# Patient Record
Sex: MALE | URBAN - METROPOLITAN AREA
[De-identification: names, ages, dates, MRNs, and addresses within clinical notes are randomized per-mention and may not be internally consistent; named-entity substitution may affect disease eponyms.]

---

## 2023-11-07 ENCOUNTER — LAB OUTSIDE AN ENCOUNTER (OUTPATIENT)
Dept: URBAN - METROPOLITAN AREA CLINIC 63 | Facility: CLINIC | Age: 73
End: 2023-11-07

## 2023-11-07 ENCOUNTER — OFFICE VISIT (OUTPATIENT)
Dept: URBAN - METROPOLITAN AREA CLINIC 63 | Facility: CLINIC | Age: 73
End: 2023-11-07
Payer: MEDICARE

## 2023-11-07 VITALS
DIASTOLIC BLOOD PRESSURE: 66 MMHG | HEART RATE: 62 BPM | SYSTOLIC BLOOD PRESSURE: 100 MMHG | BODY MASS INDEX: 24.32 KG/M2 | OXYGEN SATURATION: 99 % | TEMPERATURE: 97.3 F | WEIGHT: 164.2 LBS | HEIGHT: 69 IN

## 2023-11-07 DIAGNOSIS — R19.5 HEME POSITIVE STOOL: ICD-10-CM

## 2023-11-07 DIAGNOSIS — I25.10 CAD, MULTIPLE VESSEL: ICD-10-CM

## 2023-11-07 DIAGNOSIS — I50.9 CHRONIC CONGESTIVE HEART FAILURE, UNSPECIFIED HEART FAILURE TYPE: ICD-10-CM

## 2023-11-07 DIAGNOSIS — I25.5 ISCHEMIC CARDIOMYOPATHY: ICD-10-CM

## 2023-11-07 PROBLEM — 709044004: Status: ACTIVE | Noted: 2023-11-07

## 2023-11-07 PROBLEM — 371803003: Status: ACTIVE | Noted: 2023-11-07

## 2023-11-07 PROBLEM — 88805009: Status: ACTIVE | Noted: 2023-11-07

## 2023-11-07 PROBLEM — 281091000: Status: ACTIVE | Noted: 2023-11-07

## 2023-11-07 PROCEDURE — 99204 OFFICE O/P NEW MOD 45 MIN: CPT | Performed by: PHYSICIAN ASSISTANT

## 2023-11-07 RX ORDER — LEVOTHYROXINE SODIUM 137 UG/1
TABLET ORAL
Qty: 90 TABLET | Status: ACTIVE | COMMUNITY

## 2023-11-07 RX ORDER — TORSEMIDE 5 MG/1
1 TABLET TABLET ORAL ONCE A DAY
Qty: 90 TABLET | Status: ACTIVE | COMMUNITY

## 2023-11-07 RX ORDER — CARVEDILOL 3.12 MG/1
TAKE 2 TABLETS BY MOUTH TWO TIMES DAILY WITH MEALS TABLET, FILM COATED ORAL
Qty: 120 UNSPECIFIED | Refills: 0 | Status: ACTIVE | COMMUNITY

## 2023-11-07 RX ORDER — CHOLECALCIFEROL (VITAMIN D3) 50 MCG
1 TABLET TABLET ORAL ONCE A DAY
Status: ACTIVE | COMMUNITY

## 2023-11-07 RX ORDER — CLOPIDOGREL 75 MG/1
TABLET ORAL
Qty: 90 TABLET | Status: ACTIVE | COMMUNITY

## 2023-11-07 RX ORDER — POLYETHYLENE GLYCOL 3350, SODIUM SULFATE ANHYDROUS, SODIUM BICARBONATE, SODIUM CHLORIDE, POTASSIUM CHLORIDE 236; 22.74; 6.74; 5.86; 2.97 G/4L; G/4L; G/4L; G/4L; G/4L
4000ML POWDER, FOR SOLUTION ORAL ONCE
Qty: 4000 MILLILITER | Refills: 0 | OUTPATIENT
Start: 2023-11-07 | End: 2023-11-08

## 2023-11-07 RX ORDER — ISOSORBIDE MONONITRATE 120 MG/1
TABLET, EXTENDED RELEASE ORAL
Qty: 90 TABLET | Status: ACTIVE | COMMUNITY

## 2023-11-07 RX ORDER — ISOSORBIDE MONONITRATE 120 MG/1
TAKE 1 TABLET BY MOUTH EVERY DAY TABLET, EXTENDED RELEASE ORAL
Qty: 90 UNSPECIFIED | Refills: 0 | Status: ACTIVE | COMMUNITY

## 2023-11-07 RX ORDER — BRIMONIDINE TARTRATE 2 MG/ML
SOLUTION OPHTHALMIC
Qty: 20 MILLILITER | Status: ACTIVE | COMMUNITY

## 2023-11-07 RX ORDER — POLYETHYLENE GLYCOL 3350 17 G/17G
1 SCOOP MIXED WITH 8 OUNCES OF FLUID POWDER, FOR SOLUTION ORAL ONCE A DAY
Status: ACTIVE | COMMUNITY

## 2023-11-07 RX ORDER — ATORVASTATIN CALCIUM 80 MG/1
TABLET, FILM COATED ORAL
Qty: 90 TABLET | Status: ACTIVE | COMMUNITY

## 2023-11-07 RX ORDER — HYDRALAZINE HYDROCHLORIDE 50 MG/1
TAKE 1 TABLET BY MOUTH EVERY 8 HOURS TABLET ORAL
Qty: 90 UNSPECIFIED | Refills: 3 | Status: ACTIVE | COMMUNITY

## 2023-11-07 RX ORDER — EMPAGLIFLOZIN 10 MG/1
TABLET, FILM COATED ORAL
Qty: 90 TABLET | Status: ACTIVE | COMMUNITY

## 2023-11-07 NOTE — HPI-TODAY'S VISIT:
73-year-old male with history of severe CAD with ischemic cardiomyopathy and CHF.  He has history of MI and CABG at age 32 and has had multiple cardiac catheterizations.  His most recent cardiac cath with stent placement was done on August 22, 2023.  Currently on Plavix.  Past medical history is also significant for CKD stage IV, hyperlipidemia, AJITH, hypothyroidism. He is referred to the office for colonoscopy after having positive stool Hemoccult on September 30, 2023.  On review of his last office visit with his cardiologist in [place] on October 12, 2023, the recommendation was made to continue his Plavix until he has been on it for 3 months after the last PCI which was August 22.  His labs done on November 3, 2023 demonstrated a hemoglobin of 9.8 which is stable.  He has a history of chronic anemia.  CMP demonstrated BUN 39, creatinine 2.63.  Liver profile was normal with the exception of a slightly elevated total bilirubin of 1.2.  Iron studies were normal in August 2023.  He follows up doing well. He has no GI complaints. He denies any melena or hematochezia.   Last colonoscopy was done about 10 years ago. States this was normal.   He has no family history of colon cancer.

## 2023-12-15 ENCOUNTER — TELEPHONE ENCOUNTER (OUTPATIENT)
Dept: URBAN - METROPOLITAN AREA CLINIC 63 | Facility: CLINIC | Age: 73
End: 2023-12-15

## 2023-12-19 ENCOUNTER — TELEPHONE ENCOUNTER (OUTPATIENT)
Dept: URBAN - METROPOLITAN AREA CLINIC 63 | Facility: CLINIC | Age: 73
End: 2023-12-19

## 2024-01-10 ENCOUNTER — OFFICE VISIT (OUTPATIENT)
Dept: URBAN - METROPOLITAN AREA MEDICAL CENTER 14 | Facility: MEDICAL CENTER | Age: 74
End: 2024-01-10

## 2024-01-22 ENCOUNTER — OFFICE VISIT (OUTPATIENT)
Dept: URBAN - METROPOLITAN AREA CLINIC 63 | Facility: CLINIC | Age: 74
End: 2024-01-22

## 2024-03-08 ENCOUNTER — COLON (OUTPATIENT)
Dept: URBAN - METROPOLITAN AREA MEDICAL CENTER 14 | Facility: MEDICAL CENTER | Age: 74
End: 2024-03-08

## 2024-03-18 ENCOUNTER — LAB (OUTPATIENT)
Dept: URBAN - METROPOLITAN AREA CLINIC 63 | Facility: CLINIC | Age: 74
End: 2024-03-18

## 2024-03-18 ENCOUNTER — OV EP (OUTPATIENT)
Dept: URBAN - METROPOLITAN AREA CLINIC 63 | Facility: CLINIC | Age: 74
End: 2024-03-18
Payer: MEDICARE

## 2024-03-18 VITALS
SYSTOLIC BLOOD PRESSURE: 110 MMHG | TEMPERATURE: 97.7 F | HEART RATE: 66 BPM | OXYGEN SATURATION: 99 % | WEIGHT: 161.2 LBS | BODY MASS INDEX: 23.88 KG/M2 | HEIGHT: 69 IN | DIASTOLIC BLOOD PRESSURE: 78 MMHG

## 2024-03-18 DIAGNOSIS — I25.5 ISCHEMIC CARDIOMYOPATHY: ICD-10-CM

## 2024-03-18 DIAGNOSIS — R19.5 HEME POSITIVE STOOL: ICD-10-CM

## 2024-03-18 DIAGNOSIS — N18.9 CHRONIC KIDNEY DISEASE, UNSPECIFIED CKD STAGE: ICD-10-CM

## 2024-03-18 DIAGNOSIS — I50.9 CHRONIC CONGESTIVE HEART FAILURE, UNSPECIFIED HEART FAILURE TYPE: ICD-10-CM

## 2024-03-18 DIAGNOSIS — I25.10 CAD, MULTIPLE VESSEL: ICD-10-CM

## 2024-03-18 PROCEDURE — 99214 OFFICE O/P EST MOD 30 MIN: CPT | Performed by: PHYSICIAN ASSISTANT

## 2024-03-18 RX ORDER — POLYETHYLENE GLYCOL 3350 17 G/17G
1 SCOOP MIXED WITH 8 OUNCES OF FLUID POWDER, FOR SOLUTION ORAL ONCE A DAY
Status: ACTIVE | COMMUNITY

## 2024-03-18 RX ORDER — ISOSORBIDE MONONITRATE 120 MG/1
TAKE 1 TABLET BY MOUTH EVERY DAY TABLET, EXTENDED RELEASE ORAL
Qty: 90 UNSPECIFIED | Refills: 0 | Status: ACTIVE | COMMUNITY

## 2024-03-18 RX ORDER — CHOLECALCIFEROL (VITAMIN D3) 50 MCG
1 TABLET TABLET ORAL ONCE A DAY
Status: ACTIVE | COMMUNITY

## 2024-03-18 RX ORDER — ATORVASTATIN CALCIUM 80 MG/1
TABLET, FILM COATED ORAL
Qty: 90 TABLET | Status: ACTIVE | COMMUNITY

## 2024-03-18 RX ORDER — BRIMONIDINE TARTRATE 2 MG/ML
SOLUTION OPHTHALMIC
Qty: 20 MILLILITER | Status: ACTIVE | COMMUNITY

## 2024-03-18 RX ORDER — EMPAGLIFLOZIN 10 MG/1
TABLET, FILM COATED ORAL
Qty: 90 TABLET | Status: ACTIVE | COMMUNITY

## 2024-03-18 RX ORDER — ISOSORBIDE MONONITRATE 120 MG/1
TABLET, EXTENDED RELEASE ORAL
Qty: 90 TABLET | Status: ACTIVE | COMMUNITY

## 2024-03-18 RX ORDER — HYDRALAZINE HYDROCHLORIDE 50 MG/1
TAKE 1 TABLET BY MOUTH EVERY 8 HOURS TABLET ORAL
Qty: 90 UNSPECIFIED | Refills: 3 | Status: ACTIVE | COMMUNITY

## 2024-03-18 RX ORDER — CARVEDILOL 3.12 MG/1
TAKE 2 TABLETS BY MOUTH TWO TIMES DAILY WITH MEALS TABLET, FILM COATED ORAL
Qty: 120 UNSPECIFIED | Refills: 0 | Status: ACTIVE | COMMUNITY

## 2024-03-18 RX ORDER — TORSEMIDE 5 MG/1
1 TABLET TABLET ORAL ONCE A DAY
Qty: 90 TABLET | Status: ACTIVE | COMMUNITY

## 2024-03-18 RX ORDER — CLOPIDOGREL 75 MG/1
TABLET ORAL
Qty: 90 TABLET | Status: ACTIVE | COMMUNITY

## 2024-03-18 RX ORDER — LEVOTHYROXINE SODIUM 137 UG/1
TABLET ORAL
Qty: 90 TABLET | Status: ACTIVE | COMMUNITY

## 2024-03-18 NOTE — HPI-TODAY'S VISIT:
73-year-old male with history of severe CAD with ischemic cardiomyopathy and CHF, prior MI and CABG at age 32 and multiple cardiac catheterizations most recently in August 2022 with stent placement on Plavix, CKD stage IV, hyperlipidemia, AJITH, hypothyroidism presents to the office for follow-up after colonoscopy which was done to evaluate positive stool Hemoccult test in September 2023.  He saw his cardiologist February 14, 2024 doing well. He had a repeat cardiac catheterization February 6, 2024. He was advised by his cardiologist that he would need to wait at least 3 months before he could safely come off Plavix for his colonoscopy.   His colonoscopy was scheduled 3/8/24 but was canceled.   He presents back to the office today to discuss alternative methods to screen for colorectal cancer.  He has no GI complaints.  He denies any pyrosis, dysphagia, odynophagia, nausea, vomiting, abdominal pain, constipation, diarrhea, melena, hematochezia, hematemesis.  His last colonoscopy was done about 10 years ago and was normal according to the patient.  No report available.  He has no family history of colon cancer.

## 2024-05-06 ENCOUNTER — OFFICE VISIT (OUTPATIENT)
Dept: URBAN - METROPOLITAN AREA CLINIC 63 | Facility: CLINIC | Age: 74
End: 2024-05-06
Payer: MEDICARE

## 2024-05-06 ENCOUNTER — DASHBOARD ENCOUNTERS (OUTPATIENT)
Age: 74
End: 2024-05-06

## 2024-05-06 VITALS
OXYGEN SATURATION: 98 % | TEMPERATURE: 97.7 F | BODY MASS INDEX: 23.93 KG/M2 | HEIGHT: 69 IN | HEART RATE: 69 BPM | DIASTOLIC BLOOD PRESSURE: 60 MMHG | WEIGHT: 161.6 LBS | SYSTOLIC BLOOD PRESSURE: 110 MMHG

## 2024-05-06 DIAGNOSIS — I25.10 CAD, MULTIPLE VESSEL: ICD-10-CM

## 2024-05-06 DIAGNOSIS — I25.5 ISCHEMIC CARDIOMYOPATHY: ICD-10-CM

## 2024-05-06 DIAGNOSIS — R19.5 HEME POSITIVE STOOL: ICD-10-CM

## 2024-05-06 DIAGNOSIS — I50.9 CHRONIC CONGESTIVE HEART FAILURE, UNSPECIFIED HEART FAILURE TYPE: ICD-10-CM

## 2024-05-06 PROCEDURE — 99214 OFFICE O/P EST MOD 30 MIN: CPT | Performed by: PHYSICIAN ASSISTANT

## 2024-05-06 RX ORDER — EMPAGLIFLOZIN 10 MG/1
TABLET, FILM COATED ORAL
Qty: 90 TABLET | Status: ACTIVE | COMMUNITY

## 2024-05-06 RX ORDER — BRIMONIDINE TARTRATE 2 MG/ML
SOLUTION OPHTHALMIC
Qty: 20 MILLILITER | Status: ACTIVE | COMMUNITY

## 2024-05-06 RX ORDER — CLOPIDOGREL 75 MG/1
TABLET ORAL
Qty: 90 TABLET | Status: ACTIVE | COMMUNITY

## 2024-05-06 RX ORDER — CARVEDILOL 3.12 MG/1
TAKE 2 TABLETS BY MOUTH TWO TIMES DAILY WITH MEALS TABLET, FILM COATED ORAL
Qty: 120 UNSPECIFIED | Refills: 0 | Status: ACTIVE | COMMUNITY

## 2024-05-06 RX ORDER — POLYETHYLENE GLYCOL 3350 17 G/17G
1 SCOOP MIXED WITH 8 OUNCES OF FLUID POWDER, FOR SOLUTION ORAL ONCE A DAY
Status: ACTIVE | COMMUNITY

## 2024-05-06 RX ORDER — CHOLECALCIFEROL (VITAMIN D3) 50 MCG
1 TABLET TABLET ORAL ONCE A DAY
Status: ACTIVE | COMMUNITY

## 2024-05-06 RX ORDER — HYDRALAZINE HYDROCHLORIDE 50 MG/1
TAKE 1 TABLET BY MOUTH EVERY 8 HOURS TABLET ORAL
Qty: 90 UNSPECIFIED | Refills: 3 | Status: ACTIVE | COMMUNITY

## 2024-05-06 RX ORDER — ISOSORBIDE MONONITRATE 120 MG/1
TAKE 1 TABLET BY MOUTH EVERY DAY TABLET, EXTENDED RELEASE ORAL
Qty: 90 UNSPECIFIED | Refills: 0 | Status: ACTIVE | COMMUNITY

## 2024-05-06 RX ORDER — ISOSORBIDE MONONITRATE 120 MG/1
TABLET, EXTENDED RELEASE ORAL
Qty: 90 TABLET | Status: ACTIVE | COMMUNITY

## 2024-05-06 RX ORDER — LEVOTHYROXINE SODIUM 137 UG/1
TABLET ORAL
Qty: 90 TABLET | Status: ACTIVE | COMMUNITY

## 2024-05-06 RX ORDER — ATORVASTATIN CALCIUM 80 MG/1
TABLET, FILM COATED ORAL
Qty: 90 TABLET | Status: ACTIVE | COMMUNITY

## 2024-05-06 RX ORDER — TORSEMIDE 5 MG/1
1 TABLET TABLET ORAL ONCE A DAY
Qty: 90 TABLET | Status: ACTIVE | COMMUNITY